# Patient Record
(demographics unavailable — no encounter records)

---

## 2024-11-22 NOTE — PHYSICAL EXAM

## 2024-11-22 NOTE — DISCUSSION/SUMMARY
[] : The components of the vaccine(s) to be administered today are listed in the plan of care. The disease(s) for which the vaccine(s) are intended to prevent and the risks have been discussed with the caretaker.  The risks are also included in the appropriate vaccination information statements which have been provided to the patient's caregiver.  The caregiver has given consent to vaccinate. [FreeTextEntry1] : D/W pt well visit, reviewed nutrition/exercise, encourage safety- bike/ski helmet, seatbelt, sunblock, water safety; avoid alcohol/drug/tobacco use; advise routine dental care; reviewed puberty; reviewed and consented for vaccinations today. phq9 and craft reviewed parent declined HPV and flu vaccine.

## 2024-11-22 NOTE — BEGINNING OF VISIT
Note sent via [employer].  Will keep PCP on here in case revision is needed.  Moriah Garcia PA-C on 7/26/2024 at 3:28 PM     [Mother] : mother

## 2024-11-22 NOTE — HISTORY OF PRESENT ILLNESS
[Mother] : mother [Yes] : Patient goes to dentist yearly [Up to date] : Up to date [Eats meals with family] : eats meals with family [Normal Performance] : normal performance [Eats regular meals including adequate fruits and vegetables] : eats regular meals including adequate fruits and vegetables [Has friends] : has friends [Screen time (except homework) less than 2 hours a day] : screen time (except homework) less than 2 hours a day [Uses safety belts/safety equipment] : uses safety belts/safety equipment  [No] : Patient has not had sexual intercourse [Has ways to cope with stress] : has ways to cope with stress [Sleep Concerns] : no sleep concerns [Uses electronic nicotine delivery system] : does not use electronic nicotine delivery system [Exposure to electronic nicotine delivery system] : no exposure to electronic nicotine delivery system [Uses tobacco] : does not use tobacco [Exposure to tobacco] : no exposure to tobacco [Uses drugs] : does not use drugs  [Exposure to drugs] : no exposure to drugs [Drinks alcohol] : does not drink alcohol [Exposure to alcohol] : no exposure to alcohol [Gets depressed, anxious, or irritable/has mood swings] : does not get depressed, anxious, or irritable/has mood swings [FreeTextEntry7] : 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

## 2025-03-05 NOTE — DISCUSSION/SUMMARY
[de-identified] : General Dx Discussion The patient was advised of the diagnosis. The natural history of the pathology was explained in full to the patient in layman's terms. All questions were answered. The risks and benefits of surgical and non-surgical treatment alternatives were explained in full to the patient.  Recommend Ice. Motrin as needed. Rest from activities. Gel heel cups.  Will go for a course of PT. f/u Dr. Santana/Marcela in 2 weeks for further evaluation.

## 2025-03-05 NOTE — PHYSICAL EXAM
[Right] : right foot and ankle [NL (40)] : plantar flexion 40 degrees [NL 30)] : inversion 30 degrees [NL (20)] : eversion 20 degrees [] : no plantar metatarsal head tenderness [TWNoteComboBox7] : dorsiflexion 5 degrees

## 2025-03-05 NOTE — HISTORY OF PRESENT ILLNESS
[de-identified] : The patient is a 15 year old M who presents today complaining of right heel pain.  He c/o pain for the past week. No injury or trauma, but he is very active playing football and lacrosse. Pain with running. No N/T. No treatments. No previous issues.   Date of Injury/Onset: last week Pain:    At Rest: 3_/10  With Activity:  7_/10  Mechanism of injury: unknown This is NOT a Work Related Injury being treated under Worker's Compensation. This is NOT an athletic injury occurring associated with an interscholastic or organized sports team. Quality of symptoms: _sharp, Improves with: _ice Worse with: _movement Prior treatment: _n/a Prior Imaging: _mn/a Out of work/sport: _, since _ School/Sport/Position/Occupation:_ Additional Information: None
[de-identified] : The patient is a 15 year old M who presents today complaining of right heel pain.  He c/o pain for the past week. No injury or trauma, but he is very active playing football and lacrosse. Pain with running. No N/T. No treatments. No previous issues.   Date of Injury/Onset: last week Pain:    At Rest: 3_/10  With Activity:  7_/10  Mechanism of injury: unknown This is NOT a Work Related Injury being treated under Worker's Compensation. This is NOT an athletic injury occurring associated with an interscholastic or organized sports team. Quality of symptoms: _sharp, Improves with: _ice Worse with: _movement Prior treatment: _n/a Prior Imaging: _mn/a Out of work/sport: _, since _ School/Sport/Position/Occupation:_ Additional Information: None
13-Mar-2021 12:54

## 2025-03-05 NOTE — IMAGING
[Right] : right calcaneus [Weight -] : weightbearing [There are no fractures, subluxations or dislocations. No significant abnormalities are seen] : There are no fractures, subluxations or dislocations. No significant abnormalities are seen [Open growth plates] : Open growth plates

## 2025-03-05 NOTE — DISCUSSION/SUMMARY
[de-identified] : General Dx Discussion The patient was advised of the diagnosis. The natural history of the pathology was explained in full to the patient in layman's terms. All questions were answered. The risks and benefits of surgical and non-surgical treatment alternatives were explained in full to the patient.  Recommend Ice. Motrin as needed. Rest from activities. Gel heel cups.  Will go for a course of PT. f/u Dr. Santana/Marcela in 2 weeks for further evaluation.